# Patient Record
Sex: FEMALE | Race: WHITE
[De-identification: names, ages, dates, MRNs, and addresses within clinical notes are randomized per-mention and may not be internally consistent; named-entity substitution may affect disease eponyms.]

---

## 2017-12-10 ENCOUNTER — HOSPITAL ENCOUNTER (EMERGENCY)
Dept: HOSPITAL 5 - ED | Age: 37
LOS: 1 days | Discharge: HOME | End: 2017-12-11
Payer: COMMERCIAL

## 2017-12-10 DIAGNOSIS — I10: ICD-10-CM

## 2017-12-10 DIAGNOSIS — Z88.0: ICD-10-CM

## 2017-12-10 DIAGNOSIS — R51: Primary | ICD-10-CM

## 2017-12-10 DIAGNOSIS — J45.909: ICD-10-CM

## 2017-12-10 LAB
ANION GAP SERPL CALC-SCNC: 21 MMOL/L
APTT BLD: 31.8 SEC. (ref 24.2–36.6)
BASOPHILS NFR BLD AUTO: 0.6 % (ref 0–1.8)
BILIRUB UR QL STRIP: (no result)
BLOOD UR QL VISUAL: (no result)
BUN SERPL-MCNC: 9 MG/DL (ref 7–17)
BUN/CREAT SERPL: 18 %
CALCIUM SERPL-MCNC: 9.4 MG/DL (ref 8.4–10.2)
CHLORIDE SERPL-SCNC: 98.4 MMOL/L (ref 98–107)
CO2 SERPL-SCNC: 24 MMOL/L (ref 22–30)
EOSINOPHIL NFR BLD AUTO: 6.1 % (ref 0–4.3)
GLUCOSE SERPL-MCNC: 103 MG/DL (ref 65–100)
HCT VFR BLD CALC: 39 % (ref 30.3–42.9)
HGB BLD-MCNC: 12.1 GM/DL (ref 10.1–14.3)
INR PPP: 0.94 (ref 0.87–1.13)
KETONES UR STRIP-MCNC: (no result) MG/DL
LEUKOCYTE ESTERASE UR QL STRIP: (no result)
MCH RBC QN AUTO: 24 PG (ref 28–32)
MCHC RBC AUTO-ENTMCNC: 31 % (ref 30–34)
MCV RBC AUTO: 77 FL (ref 79–97)
NITRITE UR QL STRIP: (no result)
PH UR STRIP: 5 [PH] (ref 5–7)
PLATELET # BLD: 270 K/MM3 (ref 140–440)
POTASSIUM SERPL-SCNC: 3.9 MMOL/L (ref 3.6–5)
PROT UR STRIP-MCNC: (no result) MG/DL
RBC # BLD AUTO: 5.08 M/MM3 (ref 3.65–5.03)
RBC #/AREA URNS HPF: 1 /HPF (ref 0–6)
SODIUM SERPL-SCNC: 139 MMOL/L (ref 137–145)
UROBILINOGEN UR-MCNC: < 2 MG/DL (ref ?–2)
WBC # BLD AUTO: 16.7 K/MM3 (ref 4.5–11)
WBC #/AREA URNS HPF: 1 /HPF (ref 0–6)

## 2017-12-10 PROCEDURE — 85610 PROTHROMBIN TIME: CPT

## 2017-12-10 PROCEDURE — 93010 ELECTROCARDIOGRAM REPORT: CPT

## 2017-12-10 PROCEDURE — 85670 THROMBIN TIME PLASMA: CPT

## 2017-12-10 PROCEDURE — 81001 URINALYSIS AUTO W/SCOPE: CPT

## 2017-12-10 PROCEDURE — 36415 COLL VENOUS BLD VENIPUNCTURE: CPT

## 2017-12-10 PROCEDURE — 70450 CT HEAD/BRAIN W/O DYE: CPT

## 2017-12-10 PROCEDURE — 85025 COMPLETE CBC W/AUTO DIFF WBC: CPT

## 2017-12-10 PROCEDURE — 84484 ASSAY OF TROPONIN QUANT: CPT

## 2017-12-10 PROCEDURE — 85730 THROMBOPLASTIN TIME PARTIAL: CPT

## 2017-12-10 PROCEDURE — 93005 ELECTROCARDIOGRAM TRACING: CPT

## 2017-12-10 PROCEDURE — 80048 BASIC METABOLIC PNL TOTAL CA: CPT

## 2017-12-10 PROCEDURE — 81025 URINE PREGNANCY TEST: CPT

## 2017-12-10 NOTE — CAT SCAN REPORT
FINAL REPORT



PROCEDURE:  CT HEAD/BRAIN WO CON



TECHNIQUE:  Computerized tomography of the head was performed

without contrast material. 



HISTORY:  severe headache 



COMPARISON:  Head CT dated June 2, 2017



FINDINGS:  

Minimal mucosal thickening is seen in the ethmoid sinuses.

Mastoid air cells are clear. No calvarial fracture is seen.

Cerebral ventricles are normal in size. No CVA is seen. No acute

intracranial hemorrhage or mass effect is seen. 



IMPRESSION:  

There may be minimal changes of chronic sinusitis but no

intracranial abnormality is seen.

## 2017-12-11 VITALS — SYSTOLIC BLOOD PRESSURE: 114 MMHG | DIASTOLIC BLOOD PRESSURE: 38 MMHG

## 2017-12-11 NOTE — EMERGENCY DEPARTMENT REPORT
HPI





- General


Chief Complaint: Headache


Time Seen by Provider: 12/11/17 09:21





- HPI


HPI: 


This is a 37-year-old  female presents to the emergency department from 

home with a complaint of a posterior headache that started yesterday as well as 

some paresthesias of the right fingers.  She did not take anything at home for 

her symptoms prior to presentation but was given some Tylenol here.  The 

patient has been in the emergency department for almost 12 hours prior to 

getting back to the main portion of emergency department and she now says that 

her pain has completely resolved.  She has a past medical history of 

hypertension and possibly one previous TIA.  She denies any vision change, 

chest pain, fever, nausea, vomiting, slurred speech.  No recent travel or sick 

contacts at home.  She does not have a primary care physician.








ED Past Medical Hx





- Past Medical History


Hx Hypertension: Yes (with pregnancy)


Hx Heart Attack/AMI: No


Hx Congestive Heart Failure: No


Hx Diabetes: No


Hx Pulmonary Embolism: No


Hx Liver Disease: No


Hx Renal Disease: No


Hx Headaches / Migraines: No


Hx Seizures: No


Hx Asthma: Yes


Hx COPD: No


Hx Tuberculosis: No


Hx HIV: No


Additional medical history: TIA.  OBESITY





- Surgical History


Hx Appendectomy: Yes


Additional Surgical History: LEFT FOOT SURGERY/ CS X 3





- Social History


Smoking Status: Never Smoker


Substance Use Type: None





- Medications


Home Medications: 


 Home Medications











 Medication  Instructions  Recorded  Confirmed  Last Taken  Type


 


Albuterol Sulfate [Proair 2 puff IH Q4HR PRN #1 aer.pow.ba 10/19/16  Unknown Rx





Respiclick]     


 


Montelukast [Singulair] 10 mg PO QPM #30 tablet 10/19/16  Unknown Rx


 


Butalb/Acetamin/Caff -40 1 each PO Q4H PRN #20 tablet 06/02/17  Unknown Rx





[Fioricet]     


 


ALBUTEROL Inhaler [ProAir HFA 2 puff IH QID PRN #2 inhalation 09/10/17  Unknown 

Rx





Inhaler]     


 


predniSONE [Deltasone] 20 mg PO QDAY #5 tab 09/10/17  Unknown Rx














ED Review of Systems


ROS: 


Stated complaint: HEADACHE


Other details as noted in HPI





Comment: All other systems reviewed and negative


Constitutional: denies: chills, fever


Eyes: denies: eye pain, eye discharge, vision change


ENT: denies: ear pain, throat pain


Respiratory: denies: cough, shortness of breath, wheezing


Cardiovascular: denies: chest pain, palpitations


Gastrointestinal: denies: abdominal pain, nausea, diarrhea


Genitourinary: denies: urgency, dysuria, discharge


Musculoskeletal: denies: back pain, joint swelling, arthralgia


Skin: denies: rash, lesions


Neurological: headache, paresthesias.  denies: weakness, numbness





Physical Exam





- Physical Exam


Vital Signs: 


 Vital Signs











  12/10/17 12/11/17





  21:54 02:36


 


Temperature 98.3 F 97.8 F


 


Pulse Rate 86 80


 


Respiratory 20 18





Rate  


 


Blood Pressure 169/99 147/70


 


O2 Sat by Pulse 95 99





Oximetry  











Physical Exam: 


GENERAL: The patient is well-developed well-nourished.


HENT: Normocephalic.  Atraumatic.    Patient has moist mucous membranes.


EYES: Extraocular motions are intact.  Pupils equal reactive to light 

bilaterally.  No nystagmus.


NECK: Supple.  Trachea is midline.  No midline tenderness to palpation, step-

off or deformity.


CHEST/LUNGS: Clear to auscultation.  There is no respiratory distress noted.


HEART/CARDIOVASCULAR: Regular.  There is no tachycardia.  There is no murmur.


ABDOMEN: Abdomen is soft, nontender.  Patient has normal bowel sounds.  There 

is no abdominal distention.  Morbidly obese habitus.


SKIN: There is no rash.  There is no edema.  There is no diaphoresis.


NEURO: The patient is awake, alert, and oriented.  The patient is cooperative.  

The patient has no focal neurologic deficits.  The patient has normal speech.  

Cranial nerves II through XII grossly intact.  No dysmetria.  No pronator drift.


MUSCULOSKELETAL: There is no tenderness or deformity.  There is no limitation 

range of motion.  There is no evidence of acute injury.








ED Course


 Vital Signs











  12/10/17 12/11/17





  21:54 02:36


 


Temperature 98.3 F 97.8 F


 


Pulse Rate 86 80


 


Respiratory 20 18





Rate  


 


Blood Pressure 169/99 147/70


 


O2 Sat by Pulse 95 99





Oximetry  














ED Medical Decision Making





- Lab Data


Result diagrams: 


 12/10/17 22:08





 12/10/17 22:08





- EKG Data


-: EKG Interpreted by Me


EKG shows normal: sinus rhythm, axis, intervals, QRS complexes, ST-T waves


Rate: normal





- EKG Data


When compared to previous EKG there are: previous EKG unavailable


Interpretation: normal EKG





- Radiology Data


Radiology results: report reviewed





PROCEDURE: CT HEAD/BRAIN WO CON 





TECHNIQUE: Computerized tomography of the head was performed 


without contrast material. 





HISTORY: severe headache 





COMPARISON: Head CT dated June 2, 2017 





FINDINGS: 


Minimal mucosal thickening is seen in the ethmoid sinuses. 


Mastoid air cells are clear. No calvarial fracture is seen. 


Cerebral ventricles are normal in size. No CVA is seen. No acute 


intracranial hemorrhage or mass effect is seen. 





IMPRESSION: 


There may be minimal changes of chronic sinusitis but no 


intracranial abnormality is seen. 











Transcribed By: WW 


Dictated By: JOSE ONEIL JR, MD 


Electronically Authenticated By: JOSE ONEIL JR, MD 


Signed Date/Time: 12/10/17 1910 





- Medical Decision Making





This patient had been in the emergency department for 12 hours prior to the 

chart being available to me to see the patient.  Upon doing the history and 

physical, the patient says that her symptoms have resolved.  She does not have 

any focal, motor or sensory deficits in her cranial nerves are intact.  CT of 

the head does not show any bleed, shift, mass or any acute process.  She does 

have some hypertension but it is not an unreasonable level and otherwise the 

vitals are stable and has been throughout ED course included being afebrile.  

She does have a leukocytosis but there is no left shift and otherwise the rest 

of the labs are unremarkable.  Her symptoms resolved with only a dose of 

Tylenol.  For all these reasons the patient appears safe for discharge home.  

She was given referrals for primary care and encouraged to return to the ER 

with any worsening of her symptoms or any acute distress.





- Differential Diagnosis


tension headache, migraine, cluster headache, brain bleed


Critical Care Time: No


Critical care attestation.: 


If time is entered above; I have spent that time in minutes in the direct care 

of this critically ill patient, excluding procedure time.








ED Disposition


Clinical Impression: 


Headache


Qualifiers:


 Headache type: unspecified Headache chronicity pattern: unspecified pattern 

Intractability: not intractable Qualified Code(s): R51 - Headache





Hypertension


Qualifiers:


 Hypertension type: essential hypertension Qualified Code(s): I10 - Essential (

primary) hypertension





Disposition: DC-01 TO HOME OR SELFCARE


Is pt being admited?: No


Condition: Stable


Instructions:  Acute Headache (ED), Hypertension (ED)


Additional Instructions: 


Please follow up with a primary care physician in the next few days.  Return to 

the emergency Department with any worsening of your symptoms or any acute 

distress.


Referrals: 


[facility] [Outside] - 3-5 Days


PRIMARY CARE,MD [Primary Care Provider] - 3-5 Days


MALISSA LEY MD [Staff Physician] - 3-5 Days


Forms:  Work/School Release Form(ED)


Time of Disposition: 09:46


Print Language: Chilean

## 2019-09-29 ENCOUNTER — HOSPITAL ENCOUNTER (EMERGENCY)
Dept: HOSPITAL 5 - ED | Age: 39
LOS: 1 days | Discharge: LEFT BEFORE DISCHARGE | End: 2019-09-30
Payer: SELF-PAY

## 2019-09-29 VITALS — SYSTOLIC BLOOD PRESSURE: 171 MMHG | DIASTOLIC BLOOD PRESSURE: 83 MMHG

## 2019-09-29 DIAGNOSIS — Z88.0: ICD-10-CM

## 2019-09-29 DIAGNOSIS — I10: ICD-10-CM

## 2019-09-29 DIAGNOSIS — Z98.890: ICD-10-CM

## 2019-09-29 DIAGNOSIS — J45.909: Primary | ICD-10-CM

## 2019-09-29 DIAGNOSIS — Z90.49: ICD-10-CM

## 2019-09-29 DIAGNOSIS — Z79.899: ICD-10-CM

## 2019-09-29 PROCEDURE — 94644 CONT INHLJ TX 1ST HOUR: CPT

## 2019-09-29 PROCEDURE — 71046 X-RAY EXAM CHEST 2 VIEWS: CPT

## 2019-09-29 PROCEDURE — 99283 EMERGENCY DEPT VISIT LOW MDM: CPT

## 2019-09-29 NOTE — EMERGENCY DEPARTMENT REPORT
ED Asthma HPI





- General


Chief Complaint: Adult Asthma


Stated Complaint: ASTHMA


Time Seen by Provider: 19 21:44


Source: patient


Mode of arrival: Ambulatory


Limitations: No Limitations





- History of Present Illness


Initial Comments: 





39-year-old  female presents to the emergency room for shortness of 

breath and coughing for 3 days.  Patient reports that the coughing has gotten 

worse in the last 3 hours.  Patient reports she ran out of her inhaler 1 week 

ago.  Patient denies any fever denies any nausea admits to posttussis emesis.  

Patient currently has no medications has an allergy to penicillin.  Patient does

not have a primary care provider.


MD Complaint: shortness of breath, wheezing


Onset/Timing: 3


Asthma History: history of frequent attac


Severity: moderate


Context: ran out of meds


Associated Symptoms: productive cough





- Related Data


Current Asthma Therapy: inhaled bronchodilator


                                  Previous Rx's











 Medication  Instructions  Recorded  Last Taken  Type


 


Montelukast [Singulair] 10 mg PO QPM #30 tablet 10/19/16 Unknown Rx


 


Butalb/Acetamin/Caff -40 1 each PO Q4H PRN #20 tablet 17 Unknown Rx





[Fioricet]    


 


ALBUTEROL Inhaler (OR & NICU) 2 puff IH QID PRN #2 inhalation 09/10/17 Unknown 

Rx





[ProAir HFA Inhaler]    


 


Acetaminophen [Tylenol Arthritis] 650 mg PO Q6HR PRN #30 tablet.er 18 

Unknown Rx


 


Ibuprofen [Motrin] 600 mg PO Q8H PRN #30 tablet 18 Unknown Rx


 


Albuterol Sulfate [Proair 2 puff IH Q4HR PRN #1 aer.pow.ba 19 Unknown Rx





Respiclick]    


 


predniSONE [Deltasone] 20 mg PO QDAY #5 tab 19 Unknown Rx











                                    Allergies











Allergy/AdvReac Type Severity Reaction Status Date / Time


 


Penicillins Allergy  Rash Verified 10/19/16 09:15














ED Review of Systems


ROS: 


Stated complaint: ASTHMA


Other details as noted in HPI





Comment: All other systems reviewed and negative


Respiratory: cough, shortness of breath





ED Past Medical Hx





- Past Medical History


Previous Medical History?: Yes


Hx Hypertension: Yes (with pregnancy)


Hx Heart Attack/AMI: No


Hx Congestive Heart Failure: No


Hx Diabetes: No


Hx Pulmonary Embolism: No


Hx Liver Disease: No


Hx Renal Disease: No


Hx Headaches / Migraines: No


Hx Seizures: No


Hx Asthma: Yes


Hx COPD: No


Hx Tuberculosis: No


Hx HIV: No


Additional medical history: TIA.  OBESITY





- Surgical History


Past Surgical History?: Yes


Hx Appendectomy: Yes


Additional Surgical History: LEFT FOOT SURGERY/ CS X 3





- Social History


Smoking Status: Never Smoker


Substance Use Type: None





- Medications


Home Medications: 


                                Home Medications











 Medication  Instructions  Recorded  Confirmed  Last Taken  Type


 


Montelukast [Singulair] 10 mg PO QPM #30 tablet 10/19/16  Unknown Rx


 


Butalb/Acetamin/Caff -40 1 each PO Q4H PRN #20 tablet 17  Unknown Rx





[Fioricet]     


 


ALBUTEROL Inhaler (OR & NICU) 2 puff IH QID PRN #2 inhalation 09/10/17  Unknown 

Rx





[ProAir HFA Inhaler]     


 


Acetaminophen [Tylenol Arthritis] 650 mg PO Q6HR PRN #30 tablet.er 18  

Unknown Rx


 


Ibuprofen [Motrin] 600 mg PO Q8H PRN #30 tablet 18  Unknown Rx


 


Albuterol Sulfate [Proair 2 puff IH Q4HR PRN #1 aer.pow.ba 19  Unknown Rx





Respiclick]     


 


predniSONE [Deltasone] 20 mg PO QDAY #5 tab 19  Unknown Rx














ED Physical Exam





- General


Limitations: No Limitations


General appearance: alert, in no apparent distress





- Head


Head exam: Present: atraumatic, normocephalic





- Eye


Eye exam: Present: normal appearance





- ENT


ENT exam: Present: mucous membranes moist





- Neck


Neck exam: Present: normal inspection





- Respiratory


Respiratory exam: Present: rhonchi





- Cardiovascular


Cardiovascular Exam: Present: regular rate, normal rhythm.  Absent: systolic 

murmur, diastolic murmur, rubs, gallop





- GI/Abdominal


GI/Abdominal exam: Present: soft, normal bowel sounds





- Neurological Exam


Neurological exam: Present: alert, oriented X3, normal gait





- Psychiatric


Psychiatric exam: Present: normal affect, normal mood





- Skin


Skin exam: Present: warm, dry, intact, normal color.  Absent: rash





ED Course





                                   Vital Signs











  19





  21:46 22:45


 


Temperature 98.8 F 


 


Pulse Rate 90 


 


Pulse Rate [  84





Bilateral Bases  





]  


 


Respiratory 18 





Rate  


 


Respiratory  18





Rate [Bilateral  





Bases]  


 


Blood Pressure 171/83 


 


O2 Sat by Pulse 97 





Oximetry  














ED Medical Decision Making





- Radiology Data


Radiology results: report reviewed





Patient: GALEN FLORES MR


#: [id number]


: 1980 Acct:[id number]





Age/Sex: 39 / F ADM Date: 19





Loc: ED


Attending Dr:








Ordering Physician: BEA POZO


Date of Service: 19


Procedure(s): XR chest routine 2V


Accession Number(s): [id number]





cc: BEA POZO





Fluoro Time In Minutes:





CHEST 2 VIEWS





INDICATION / CLINICAL INFORMATION:


Cough and chest discomfort.





COMPARISON:


10/19/2015.





FINDINGS:





SUPPORT DEVICES: None.


HEART / MEDIASTINUM: There is mild cardiomegaly. Pulmonary vasculature is 

normal.


LUNGS / PLEURA: No significant pulmonary or pleural abnormality. No 

pneumothorax.


ADDITIONAL FINDINGS: No significant additional findings.





IMPRESSION: Mild cardiomegaly without acute pulmonary disease.





Signer Name: Kole Carney MD


Signed: 2019 10:24 PM


Workstation Name: [id number]








Transcribed By: RT


Dictated By: Kole Carney MD


Electronically Authenticated By: Kole Carney MD


Signed Date/Time: 19











DD/DT: 19


TD/TT:





- Medical Decision Making








39-year-old  female presents to the emergency room for shortness of 

breath and coughing for 3 days.  Patient reports that the coughing has gotten 

worse in the last 3 hours.  Patient reports she ran out of her inhaler 1 week 

ago.  Patient denies any fever denies any nausea admits to posttussis emesis.  

Patient currently has no medications has an allergy to penicillin.  Patient does

not have a primary care provider.


Critical care attestation.: 


If time is entered above; I have spent that time in minutes in the direct care 

of this critically ill patient, excluding procedure time.








ED Disposition


Clinical Impression: 


 Asthma





Disposition:  PAT REG,NO TRIAGE


Is pt being admited?: No


Condition: Stable


Instructions:  Asthma (ED)


Prescriptions: 


predniSONE [Deltasone] 20 mg PO QDAY #5 tab


Albuterol Sulfate [Proair Respiclick] 2 puff IH Q4HR PRN #1 aer.pow.ba


 PRN Reason: Shortness Of Breath


Referrals: 


PRIMARY CARE,MD [Primary Care Provider] - 3-5 Days

## 2019-09-29 NOTE — EVENT NOTE
ED Screening Note


Date of service: 09/29/19


Time: 21:45


ED Screening Note: 





This is a 39 y.o. F. that presents to the ER with dyspnea and chest discomfort 

with cough today.





PMH of asthma and HTN





Reports running out of inhaler 1 week ago.





This initial assessment/diagnostic orders/clinical plan/treatment(s) is/are 

subject to change based on patients health status, clinical progression and re-

assessment by fellow clinical providers in the ED. Further treatment and workup 

at subsequent clinical providers discretion. Patient/guardian urged not to elope

from the ED as their condition may be serious if not clinically assessed and 

managed. 





Initial orders include: 





CXR





Duoneb

## 2019-09-29 NOTE — XRAY REPORT
CHEST 2 VIEWS



INDICATION / CLINICAL INFORMATION:

Cough and chest discomfort.



COMPARISON: 

10/19/2015.



FINDINGS:



SUPPORT DEVICES: None.

HEART / MEDIASTINUM: There is mild cardiomegaly. Pulmonary vasculature is normal. 

LUNGS / PLEURA: No significant pulmonary or pleural abnormality. No pneumothorax. 

ADDITIONAL FINDINGS: No significant additional findings.



IMPRESSION: Mild cardiomegaly without acute pulmonary disease.



Signer Name: Kole Carney MD 

Signed: 9/29/2019 10:24 PM

 Workstation Name: [id number]

## 2019-11-17 ENCOUNTER — HOSPITAL ENCOUNTER (EMERGENCY)
Dept: HOSPITAL 5 - ED | Age: 39
LOS: 1 days | Discharge: HOME | End: 2019-11-18
Payer: SELF-PAY

## 2019-11-17 DIAGNOSIS — Z88.0: ICD-10-CM

## 2019-11-17 DIAGNOSIS — Z79.899: ICD-10-CM

## 2019-11-17 DIAGNOSIS — J45.909: Primary | ICD-10-CM

## 2019-11-17 DIAGNOSIS — Z98.890: ICD-10-CM

## 2019-11-17 DIAGNOSIS — I10: ICD-10-CM

## 2019-11-17 DIAGNOSIS — Z90.89: ICD-10-CM

## 2019-11-17 PROCEDURE — 99282 EMERGENCY DEPT VISIT SF MDM: CPT

## 2019-11-17 PROCEDURE — 94640 AIRWAY INHALATION TREATMENT: CPT

## 2019-11-17 PROCEDURE — 94644 CONT INHLJ TX 1ST HOUR: CPT

## 2019-11-17 PROCEDURE — 96372 THER/PROPH/DIAG INJ SC/IM: CPT

## 2019-11-17 NOTE — EMERGENCY DEPARTMENT REPORT
ED Asthma HPI





- General


Chief Complaint: Adult Asthma


Stated Complaint: ASTHMA


Time Seen by Provider: 19 21:55


Source: patient


Mode of arrival: Ambulatory


Limitations: No Limitations





- History of Present Illness


Initial Comments: 





39-year-old  female presents to the emergency room for asthma 

exacerbation 2 days.  Patient denies any fever.  Patient reports she ran out of

her inhaler.  Patient states that she was seen here last month for the same.


MD Complaint: "asthma attack"


Onset/Timin


-: days(s)


Asthma History: history of frequent attac


Severity: moderate


Context: ran out of meds


Associated Symptoms: dry cough





- Related Data


Current Asthma Therapy: inhaled bronchodilator


                                  Previous Rx's











 Medication  Instructions  Recorded  Last Taken  Type


 


Montelukast [Singulair] 10 mg PO QPM #30 tablet 10/19/16 Unknown Rx


 


Butalb/Acetamin/Caff -40 1 each PO Q4H PRN #20 tablet 17 Unknown Rx





[Fioricet]    


 


Acetaminophen [Tylenol Arthritis] 650 mg PO Q6HR PRN #30 tablet.er 18 

Unknown Rx


 


Ibuprofen [Motrin] 600 mg PO Q8H PRN #30 tablet 18 Unknown Rx


 


Albuterol Sulfate [Proair 2 puff IH Q4HR PRN #1 aer.pow.ba 19 Unknown Rx





Respiclick]    


 


ALBUTEROL Inhaler (OR & NICU) 2 puff IH QID PRN #2 inhalation 19 Unknown 

Rx





[ProAir HFA Inhaler]    


 


ALBUTEROL NEB's [Proventil 0.083% 2.5 mg IH TID PRN #270 neb 19 Unknown Rx





NEBS]    


 


Nebulizer and Compressor [Portable 1 each MC TID #1 each 19 Unknown Rx





Nebulizer System]    


 


predniSONE [Deltasone] 20 mg PO QDAY #5 tab 19 Unknown Rx











                                    Allergies











Allergy/AdvReac Type Severity Reaction Status Date / Time


 


Penicillins Allergy  Rash Verified 10/19/16 09:15














ED Review of Systems


ROS: 


Stated complaint: ASTHMA


Other details as noted in HPI





Comment: All other systems reviewed and negative





ED Past Medical Hx





- Past Medical History


Previous Medical History?: Yes


Hx Hypertension: Yes (with pregnancy)


Hx Heart Attack/AMI: No


Hx Congestive Heart Failure: No


Hx Diabetes: No


Hx Pulmonary Embolism: No


Hx Liver Disease: No


Hx Renal Disease: No


Hx Headaches / Migraines: No


Hx Seizures: No


Hx Asthma: Yes


Hx COPD: No


Hx Tuberculosis: No


Hx HIV: No


Additional medical history: TIA.  OBESITY





- Surgical History


Past Surgical History?: Yes


Hx Appendectomy: Yes


Additional Surgical History: LEFT FOOT SURGERY/ CS X 3





- Social History


Smoking Status: Never Smoker


Substance Use Type: None





- Medications


Home Medications: 


                                Home Medications











 Medication  Instructions  Recorded  Confirmed  Last Taken  Type


 


Montelukast [Singulair] 10 mg PO QPM #30 tablet 10/19/16  Unknown Rx


 


Butalb/Acetamin/Caff -40 1 each PO Q4H PRN #20 tablet 17  Unknown Rx





[Fioricet]     


 


Acetaminophen [Tylenol Arthritis] 650 mg PO Q6HR PRN #30 tablet.er 18  

Unknown Rx


 


Ibuprofen [Motrin] 600 mg PO Q8H PRN #30 tablet 18  Unknown Rx


 


Albuterol Sulfate [Proair 2 puff IH Q4HR PRN #1 aer.pow.ba 19  Unknown Rx





Respiclick]     


 


ALBUTEROL Inhaler (OR & NICU) 2 puff IH QID PRN #2 inhalation 19  Unknown 

Rx





[ProAir HFA Inhaler]     


 


ALBUTEROL NEB's [Proventil 0.083% 2.5 mg IH TID PRN #270 neb 19  Unknown 

Rx





NEBS]     


 


Nebulizer and Compressor [Portable 1 each MC TID #1 each 19  Unknown Rx





Nebulizer System]     


 


predniSONE [Deltasone] 20 mg PO QDAY #5 tab 19  Unknown Rx














ED Physical Exam





- General


Limitations: No Limitations


General appearance: alert, in no apparent distress





- Head


Head exam: Present: atraumatic, normocephalic





- Eye


Eye exam: Present: normal appearance





- ENT


ENT exam: Present: mucous membranes moist





- Neck


Neck exam: Present: normal inspection





- Respiratory


Respiratory exam: Present: wheezes, rhonchi





- Cardiovascular


Cardiovascular Exam: Present: regular rate, normal rhythm.  Absent: systolic 

murmur, diastolic murmur, rubs, gallop





- Neurological Exam


Neurological exam: Present: alert, oriented X3





- Psychiatric


Psychiatric exam: Present: normal affect, normal mood





- Skin


Skin exam: Present: warm, dry, intact, normal color.  Absent: rash





ED Course


                                   Vital Signs











  19





  21:45 22:15


 


Temperature 98.8 F 


 


Pulse Rate 93 H 


 


Pulse Rate [  96 H





Anterior  





Bilateral  





Throughout]  


 


Respiratory 18 





Rate  


 


Respiratory  21





Rate [Anterior  





Bilateral  





Throughout]  


 


Blood Pressure 172/97 


 


O2 Sat by Pulse 95 





Oximetry  














ED Medical Decision Making





- Medical Decision Making








39-year-old  female presents to the emergency room for asthma 

exacerbation 2 days.  Patient denies any fever.  Patient reports she ran out of

her inhaler.  Patient states that she was seen here last month for the same.











Respiratory therapy has been called for respiratory medications.


Critical care attestation.: 


If time is entered above; I have spent that time in minutes in the direct care 

of this critically ill patient, excluding procedure time.








ED Disposition


Clinical Impression: 


 Asthma





Disposition: DC-01 TO HOME OR SELFCARE


Is pt being admited?: No


Does the pt Need Aspirin: No


Condition: Stable


Instructions:  Asthma (ED), Reactive Airways Disease (ED)


Prescriptions: 


predniSONE [Deltasone] 20 mg PO QDAY #5 tab


Nebulizer and Compressor [Portable Nebulizer System] 1 each MC TID #1 each


ALBUTEROL Inhaler (OR & NICU) [ProAir HFA Inhaler] 2 puff IH QID PRN #2 

inhalation


 PRN Reason: Shortness Of Breath


ALBUTEROL NEB's [Proventil 0.083% NEBS] 2.5 mg IH TID PRN #270 neb


 PRN Reason: Wheezing


Referrals: 


PRIMARY CARE,MD [Primary Care Provider] - 3-5 Days

## 2019-11-17 NOTE — EMERGENCY DEPARTMENT REPORT
Blank Doc





- Documentation


Documentation: 





39-year-old female that presents with asthma exacerbation.





This initial assessment/diagnostic orders/clinical plan/treatment(s) is/are 

subject to change based on patient's health status, clinical progression and re-

assessment by fellow clinical providers in the ED.  Further treatment and workup

at subsequent clinical providers discretion.  Patient/guardians urged not to 

elope from the ED as their condition may be serious if not clinically assessed 

and managed.  Initial orders include:


1- Patient sent to [facility] for further evaluation and treatment


2- breathing treatment/steroids

## 2019-11-18 VITALS — DIASTOLIC BLOOD PRESSURE: 76 MMHG | SYSTOLIC BLOOD PRESSURE: 127 MMHG

## 2020-01-12 ENCOUNTER — HOSPITAL ENCOUNTER (EMERGENCY)
Dept: HOSPITAL 5 - ED | Age: 40
Discharge: LEFT BEFORE DISCHARGE | End: 2020-01-12
Payer: COMMERCIAL

## 2020-01-12 VITALS — DIASTOLIC BLOOD PRESSURE: 105 MMHG | SYSTOLIC BLOOD PRESSURE: 174 MMHG

## 2020-01-12 DIAGNOSIS — Z53.21: ICD-10-CM

## 2020-01-12 DIAGNOSIS — R20.0: Primary | ICD-10-CM

## 2020-01-12 PROCEDURE — 93005 ELECTROCARDIOGRAM TRACING: CPT

## 2020-01-12 PROCEDURE — 93010 ELECTROCARDIOGRAM REPORT: CPT

## 2020-03-24 ENCOUNTER — HOSPITAL ENCOUNTER (EMERGENCY)
Dept: HOSPITAL 5 - ED | Age: 40
Discharge: HOME | End: 2020-03-24
Payer: COMMERCIAL

## 2020-03-24 VITALS — SYSTOLIC BLOOD PRESSURE: 141 MMHG | DIASTOLIC BLOOD PRESSURE: 99 MMHG

## 2020-03-24 DIAGNOSIS — J45.909: Primary | ICD-10-CM

## 2020-03-24 PROCEDURE — 99282 EMERGENCY DEPT VISIT SF MDM: CPT

## 2020-03-24 NOTE — EMERGENCY DEPARTMENT REPORT
ED General Adult HPI





- General


Chief complaint: Upper Respiratory Infection


Stated complaint: ASTHMA


Time Seen by Provider: 03/24/20 13:38


Source: patient


Mode of arrival: Ambulatory


Limitations: No Limitations





- History of Present Illness


Initial comments: 


40yo  female states that she has asthma symptoms along with SOB x 3 days

and is currently out of her HARRIS. She further states that her SOB are mild.


-: days(s)


Location: chest


Severity scale (0 -10): 4


Quality: aching


Consistency: intermittent


Improves with: none


Worsens with: none


Associated Symptoms: denies other symptoms


Treatments Prior to Arrival: none





- Related Data


                                  Previous Rx's











 Medication  Instructions  Recorded  Last Taken  Type


 


Montelukast [Singulair] 10 mg PO QPM #30 tablet 10/19/16 Unknown Rx


 


Butalb/Acetamin/Caff -40 1 each PO Q4H PRN #20 tablet 06/02/17 Unknown Rx





[Fioricet]    


 


Acetaminophen [Tylenol Arthritis] 650 mg PO Q6HR PRN #30 tablet.er 12/26/18 

Unknown Rx


 


Ibuprofen [Motrin] 600 mg PO Q8H PRN #30 tablet 12/26/18 Unknown Rx


 


Albuterol Sulfate [Proair 2 puff IH Q4HR PRN #1 aer.pow.ba 09/29/19 Unknown Rx





Respiclick]    


 


ALBUTEROL NEB's [Proventil 0.083% 2.5 mg IH TID PRN #270 neb 11/17/19 Unknown Rx





NEBS]    


 


Albuterol INH(or & Nicu Only) 2 puff IH QID PRN #2 inhalation 11/17/19 Unknown 

Rx





[ProAir HFA Inhaler]    


 


Nebulizer and Compressor [Portable 1 each MC TID #1 each 11/17/19 Unknown Rx





Nebulizer System]    


 


predniSONE [Deltasone] 20 mg PO QDAY #5 tab 11/17/19 Unknown Rx


 


Albuterol INH(or & Nicu Only) 2 puff IH QID PRN #8.5 gram 03/24/20 Unknown Rx





[ProAir HFA Inhaler]    











                                    Allergies











Allergy/AdvReac Type Severity Reaction Status Date / Time


 


Penicillins Allergy  Rash Verified 10/19/16 09:15














ED Review of Systems


ROS: 


Stated complaint: ASTHMA


Other details as noted in HPI





Comment: All other systems reviewed and negative


Constitutional: no symptoms reported


Respiratory: see HPI


Cardiovascular: as per HPI





ED Past Medical Hx





- Past Medical History


Hx Hypertension: Yes (with pregnancy)


Hx Heart Attack/AMI: No


Hx Congestive Heart Failure: No


Hx Diabetes: No


Hx Pulmonary Embolism: No


Hx Liver Disease: No


Hx Renal Disease: No


Hx Headaches / Migraines: No


Hx Seizures: No


Hx Asthma: Yes


Hx COPD: No


Hx Tuberculosis: No


Hx HIV: No


Additional medical history: TIA.  OBESITY





- Surgical History


Hx Appendectomy: Yes


Additional Surgical History: LEFT FOOT SURGERY/ CS X 3





- Social History


Smoking Status: Never Smoker


Substance Use Type: None





- Medications


Home Medications: 


                                Home Medications











 Medication  Instructions  Recorded  Confirmed  Last Taken  Type


 


Montelukast [Singulair] 10 mg PO QPM #30 tablet 10/19/16  Unknown Rx


 


Butalb/Acetamin/Caff -40 1 each PO Q4H PRN #20 tablet 06/02/17  Unknown Rx





[Fioricet]     


 


Acetaminophen [Tylenol Arthritis] 650 mg PO Q6HR PRN #30 tablet.er 12/26/18  

Unknown Rx


 


Ibuprofen [Motrin] 600 mg PO Q8H PRN #30 tablet 12/26/18  Unknown Rx


 


Albuterol Sulfate [Proair 2 puff IH Q4HR PRN #1 aer.pow.ba 09/29/19  Unknown Rx





Respiclick]     


 


ALBUTEROL NEB's [Proventil 0.083% 2.5 mg IH TID PRN #270 neb 11/17/19  Unknown 

Rx





NEBS]     


 


Albuterol INH(or & Nicu Only) 2 puff IH QID PRN #2 inhalation 11/17/19  Unknown 

Rx





[ProAir HFA Inhaler]     


 


Nebulizer and Compressor [Portable 1 each MC TID #1 each 11/17/19  Unknown Rx





Nebulizer System]     


 


predniSONE [Deltasone] 20 mg PO QDAY #5 tab 11/17/19  Unknown Rx


 


Albuterol INH(or & Nicu Only) 2 puff IH QID PRN #8.5 gram 03/24/20  Unknown Rx





[ProAir HFA Inhaler]     














ED Physical Exam





- General


Limitations: No Limitations


General appearance: alert





- Head


Head exam: Present: atraumatic, normocephalic, normal inspection





- Eye


Eye exam: Present: normal appearance, PERRL, EOMI.  Absent: scleral icterus





- ENT


ENT exam: Present: normal exam, normal orophraynx, normal external ear exam





- Neck


Neck exam: Present: normal inspection, full ROM.  Absent: tenderness





- Respiratory


Respiratory exam: Present: normal lung sounds bilaterally, respiratory distress 

(mild with inspiration), wheezes (CESAR mild wheeze)





- Cardiovascular


Cardiovascular Exam: Present: regular rate, normal rhythm, bradycardia





- GI/Abdominal


GI/Abdominal exam: Present: soft.  Absent: distended, tenderness





- Rectal


Rectal exam: Present: deferred





- Extremities Exam


Extremities exam: Present: normal inspection, full ROM.  Absent: tenderness





- Back Exam


Back exam: Present: normal inspection, full ROM.  Absent: tenderness





- Neurological Exam


Neurological exam: Present: alert, altered, oriented X3





- Psychiatric


Psychiatric exam: Present: normal affect, normal mood.  Absent: anxious





- Skin


Skin exam: Present: warm, dry, intact, normal color





ED Course





                                   Vital Signs











  03/24/20





  11:59


 


Temperature 97.8 F


 


Pulse Rate 84


 


Respiratory 16





Rate 


 


Blood Pressure 141/99





[Left] 


 


O2 Sat by Pulse 96





Oximetry 














ED Medical Decision Making





- Medical Decision Making


40yo  female states that she has asthma symptoms along with SOB x 3 days

 and is currently out of her HARRIS. She further states that her SOB are mild. Pt 

was assessed and her mild SOB was treated with a refill on her HARRIS inhaler. She

 was instructed to use it as prescribed, f/u with her PCP and see ER if symptoms

 worsen. 





Critical care attestation.: 


If time is entered above; I have spent that time in minutes in the direct care 

of this critically ill patient, excluding procedure time.








ED Disposition


Clinical Impression: 


 Asthma





Disposition: DC-01 TO HOME OR SELFCARE


Is pt being admited?: No


Does the pt Need Aspirin: No


Condition: Stable


Instructions:  Asthma (ED)


Additional Instructions: 


She was instructed to use it as prescribed, f/u with her PCP and see ER if 

symptoms worsen. 


Prescriptions: 


Albuterol INH(or & Nicu Only) [ProAir HFA Inhaler] 2 puff IH QID PRN #8.5 gram


 PRN Reason: Shortness Of Breath


Referrals: 


PRIMARY CARE,MD [Primary Care Provider] - 3-5 Days


[facility] [Outside] - 3-5 Days

## 2020-05-24 ENCOUNTER — HOSPITAL ENCOUNTER (EMERGENCY)
Dept: HOSPITAL 5 - ED | Age: 40
Discharge: HOME | End: 2020-05-24
Payer: COMMERCIAL

## 2020-05-24 VITALS — DIASTOLIC BLOOD PRESSURE: 87 MMHG | SYSTOLIC BLOOD PRESSURE: 148 MMHG

## 2020-05-24 DIAGNOSIS — Z98.890: ICD-10-CM

## 2020-05-24 DIAGNOSIS — Z88.0: ICD-10-CM

## 2020-05-24 DIAGNOSIS — J45.901: Primary | ICD-10-CM

## 2020-05-24 DIAGNOSIS — Z90.49: ICD-10-CM

## 2020-05-24 DIAGNOSIS — J18.9: ICD-10-CM

## 2020-05-24 DIAGNOSIS — I10: ICD-10-CM

## 2020-05-24 DIAGNOSIS — Z79.1: ICD-10-CM

## 2020-05-24 DIAGNOSIS — Z79.899: ICD-10-CM

## 2020-05-24 DIAGNOSIS — Z79.2: ICD-10-CM

## 2020-05-24 LAB
ANISOCYTOSIS BLD QL SMEAR: (no result)
BAND NEUTROPHILS # (MANUAL): 0.3 K/MM3
BUN SERPL-MCNC: 16 MG/DL (ref 7–17)
BUN/CREAT SERPL: 27 %
CALCIUM SERPL-MCNC: 9.6 MG/DL (ref 8.4–10.2)
HCT VFR BLD CALC: 35.8 % (ref 30.3–42.9)
HEMOLYSIS INDEX: 4
HGB BLD-MCNC: 11.2 GM/DL (ref 10.1–14.3)
MCHC RBC AUTO-ENTMCNC: 31 % (ref 30–34)
MCV RBC AUTO: 80 FL (ref 79–97)
MYELOCYTES # (MANUAL): 0.2 K/MM3
PLATELET # BLD: 320 K/MM3 (ref 140–440)
PROMYELOCYTES # (MANUAL): 0 K/MM3
RBC # BLD AUTO: 4.46 M/MM3 (ref 3.65–5.03)
TOTAL CELLS COUNTED BLD: 100

## 2020-05-24 PROCEDURE — 36415 COLL VENOUS BLD VENIPUNCTURE: CPT

## 2020-05-24 PROCEDURE — 85007 BL SMEAR W/DIFF WBC COUNT: CPT

## 2020-05-24 PROCEDURE — 94644 CONT INHLJ TX 1ST HOUR: CPT

## 2020-05-24 PROCEDURE — 80048 BASIC METABOLIC PNL TOTAL CA: CPT

## 2020-05-24 PROCEDURE — 99284 EMERGENCY DEPT VISIT MOD MDM: CPT

## 2020-05-24 PROCEDURE — 85025 COMPLETE CBC W/AUTO DIFF WBC: CPT

## 2020-05-24 PROCEDURE — 87040 BLOOD CULTURE FOR BACTERIA: CPT

## 2020-05-24 PROCEDURE — 71045 X-RAY EXAM CHEST 1 VIEW: CPT

## 2020-05-24 NOTE — EMERGENCY DEPARTMENT REPORT
HPI





- General


Chief Complaint: Adult Asthma


Time Seen by Provider: 20 02:46





- HPI


HPI: 





Room 26








The patient is a 40-year-old female present with a chief complaint of asthma 

exacerbation.  The patient states for the past 3 days she has had a cough p

roductive of clear sputum.  Patient states her asthma is exacerbated causing her

to wheeze.  Patient denies history of fever.  In the ED patient states she feels

slightly improved since receiving a nebulizer





ED Past Medical Hx





- Past Medical History


Previous Medical History?: Yes


Hx Hypertension: Yes (with pregnancy)


Hx Asthma: Yes


Additional medical history: TIA.  OBESITY





- Surgical History


Past Surgical History?: Yes


Hx Appendectomy: Yes


Additional Surgical History: LEFT FOOT SURGERY/ CS X 3





- Family History


Family history: no significant





- Social History


Smoking Status: Never Smoker


Substance Use Type: None





- Medications


Home Medications: 


                                Home Medications











 Medication  Instructions  Recorded  Confirmed  Last Taken  Type


 


Montelukast [Singulair] 10 mg PO QPM #30 tablet 10/19/16  Unknown Rx


 


Butalb/Acetamin/Caff -40 1 each PO Q4H PRN #20 tablet 17  Unknown Rx





[Fioricet]     


 


Acetaminophen [Tylenol Arthritis] 650 mg PO Q6HR PRN #30 tablet.er 18  

Unknown Rx


 


Ibuprofen [Motrin] 600 mg PO Q8H PRN #30 tablet 18  Unknown Rx


 


Albuterol Sulfate [Proair 2 puff IH Q4HR PRN #1 aer.pow.ba 19  Unknown Rx





Respiclick]     


 


ALBUTEROL NEB's [Proventil 0.083% 2.5 mg IH TID PRN #270 neb 19  Unknown 

Rx





NEBS]     


 


Albuterol INH(or & Nicu Only) 2 puff IH QID PRN #2 inhalation 19  Unknown 

Rx





[ProAir HFA Inhaler]     


 


Nebulizer and Compressor [Portable 1 each MC TID #1 each 19  Unknown Rx





Nebulizer System]     


 


predniSONE [Deltasone] 20 mg PO QDAY #5 tab 19  Unknown Rx


 


Albuterol INH(or & Nicu Only) 2 puff IH QID PRN #8.5 gram 20  Unknown Rx





[ProAir HFA Inhaler]     


 


Albuterol INH(or & Nicu Only) 2 puff IH QID PRN #8.5 gram 20  Unknown Rx





[ProAir HFA Inhaler]     


 


Prednisone [predniSONE 10 mg 10 mg PO .TAPER #1 tab.ds.pk 20  Unknown Rx





(6-Day Pack, 21 Tabs)]     


 


levoFLOXacin [Levaquin TAB] 500 mg PO QDAY #10 tablet 20  Unknown Rx














ED Review of Systems


ROS: 


Stated complaint: ASTHMA ATTACK


Other details as noted in HPI





Constitutional: denies: fever


Respiratory: cough, wheezing





Physical Exam





- Physical Exam


Vital Signs: 


                                   Vital Signs











  20





  02:07 02:35


 


Temperature 98.5 F 


 


Pulse Rate 105 H 


 


Pulse Rate [  103 H





Bilateral  





Throughout]  


 


Respiratory 18 





Rate  


 


Respiratory  20





Rate [Bilateral  





Throughout]  


 


Blood Pressure 148/98 


 


O2 Sat by Pulse 97 





Oximetry  











Physical Exam: 





GENERAL: The patient is well-developed well-nourished female lying on stretcher 

not appearing to be in acute distress. []


HEENT: Normocephalic.  Atraumatic.  Extraocular motions are intact.  Patient has

 moist mucous membranes.


NECK: Supple.  Trachea midline


CHEST/LUNGS: Faint occasional expiratory wheezing.  There is no respiratory 

distress noted.


HEART/CARDIOVASCULAR: Regular.  There is no tachycardia.  There is no gallop rub

 or murmur.


ABDOMEN: Abdomen is soft, nontender.  Patient has normal bowel sounds.  There is

 no abdominal distention.


SKIN: There is no rash.  There is no edema.  There is no diaphoresis.


NEURO: The patient is awake, alert, and oriented.  The patient is cooperative.  

 The patient has normal speech


MUSCULOSKELETAL: There is no evidence of acute injury.





ED Course


                                   Vital Signs











  20





  02:07 02:35


 


Temperature 98.5 F 


 


Pulse Rate 105 H 


 


Pulse Rate [  103 H





Bilateral  





Throughout]  


 


Respiratory 18 





Rate  


 


Respiratory  20





Rate [Bilateral  





Throughout]  


 


Blood Pressure 148/98 


 


O2 Sat by Pulse 97 





Oximetry  














ED Medical Decision Making





- Lab Data


Result diagrams: 


                                 20 03:31





                                 20 03:31





- Radiology Data


Radiology results: report reviewed (Chest x-ray), image reviewed (Chest x-ray)


interpreted by me: 





Chest x-ray-right lower lobe infiltrate





Findings


[facility] [address] [place] [address] 

XRay Report Signed Patient: GALEN FLORES MR #: [id number] : 

1980 Acct:[id number] Age/Sex: 40 / F ADM Date: 20 Loc: ED 

Attending Dr: Ordering Physician: DEVAN SANCHES MD Date of Service: 20 

Procedure(s): XR chest 1V ap Accession Number(s): [id number] cc: DEVAN SANCHES MD 

Fluoro Time In Minutes: CHEST 1 VIEW, 2020 2:12 AM CLINICAL INFORMATION/IN

DICATION: Cough. COMPARISON: Chest radiograph, 2019 FINDINGS: SUPPORT 

DEVICES: None. HEART: There is stable mild enlargement of the cardiac 

silhouette. LUNGS/PLEURA: There is focal parenchymal density within the right 

midlung. The left lung appears clear. Lung volumes are low bilaterally. ADDIT

IONAL FINDINGS: No additional acute findings. IMPRESSION: 1. Focal parenchymal 

density in the right midlung suspicious for developing airspace disease such as 

pneumonia. Signer Name: Rosibel Ibrahim MD Signed: 2020 3:21 AM Workstation 

Name: [id number] Transcribed By: EB Dictated By: Rosibel Ibrahim MD 

Electronically Authenticated By: Rosibel Ibrahim MD Signed Date/Time: 

20 DD/DT: 20 TD/TT: 











- Medical Decision Making





Patient's SPO2 97% on room air after ambulation for 2 minutes





- Differential Diagnosis


Acute asthma exacerbation, bronchitis


Critical care attestation.: 


If time is entered above; I have spent that time in minutes in the direct care 

of this critically ill patient, excluding procedure time.








ED Disposition


Clinical Impression: 


 Acute asthma exacerbation, Pneumonia





Disposition: DC-01 TO HOME OR SELFCARE


Is pt being admited?: No


Does the pt Need Aspirin: No


Condition: Stable


Instructions:  Bacterial Pneumonia (ED)


Additional Instructions: 


You are to self quarantine at home for 14 days.  Return to the emergency 

department should you develop worsening symptoms, inability to tolerate food or 

liquids, high fever or any other concerns


Prescriptions: 


levoFLOXacin [Levaquin TAB] 500 mg PO QDAY #10 tablet


Prednisone [predniSONE 10 mg (6-Day Pack, 21 Tabs)] 10 mg PO .TAPER #1 tab.ds.pk


Albuterol INH(or & Nicu Only) [ProAir HFA Inhaler] 2 puff IH QID PRN #8.5 gram


 PRN Reason: Shortness Of Breath


Referrals: 


PRIMARY CARE,MD [Primary Care Provider] - 3-5 Days


[facility] [Provider Group] - 3-5 Days


Time of Disposition: 05:07

## 2020-05-24 NOTE — XRAY REPORT
CHEST 1 VIEW, 5/24/2020 2:12 AM 



CLINICAL INFORMATION/INDICATION: Cough.



COMPARISON: Chest radiograph, 9/29/2019



FINDINGS:



SUPPORT DEVICES: None.

HEART: There is stable mild enlargement of the cardiac silhouette.

LUNGS/PLEURA: There is focal parenchymal density within the right midlung. The left lung appears lester
r. Lung volumes are low bilaterally.

ADDITIONAL FINDINGS: No additional acute findings.



IMPRESSION:

1. Focal parenchymal density in the right midlung suspicious for developing airspace disease such as 
pneumonia.



Signer Name: Rosibel Ibrahim MD 

Signed: 5/24/2020 3:21 AM

Workstation Name: [id number]

## 2020-06-11 ENCOUNTER — HOSPITAL ENCOUNTER (EMERGENCY)
Dept: HOSPITAL 5 - ED | Age: 40
LOS: 1 days | Discharge: HOME | End: 2020-06-12
Payer: COMMERCIAL

## 2020-06-11 DIAGNOSIS — Z88.0: ICD-10-CM

## 2020-06-11 DIAGNOSIS — Z98.890: ICD-10-CM

## 2020-06-11 DIAGNOSIS — Z79.899: ICD-10-CM

## 2020-06-11 DIAGNOSIS — I10: ICD-10-CM

## 2020-06-11 DIAGNOSIS — J16.8: Primary | ICD-10-CM

## 2020-06-11 DIAGNOSIS — Z20.828: ICD-10-CM

## 2020-06-11 DIAGNOSIS — Z90.49: ICD-10-CM

## 2020-06-11 DIAGNOSIS — J45.909: ICD-10-CM

## 2020-06-11 LAB
BASOPHILS # (AUTO): 0 K/MM3 (ref 0–0.1)
BASOPHILS NFR BLD AUTO: 0.4 % (ref 0–1.8)
BUN SERPL-MCNC: 10 MG/DL (ref 7–17)
BUN/CREAT SERPL: 20 %
CALCIUM SERPL-MCNC: 9 MG/DL (ref 8.4–10.2)
EOSINOPHIL # BLD AUTO: 0.3 K/MM3 (ref 0–0.4)
EOSINOPHIL NFR BLD AUTO: 3.9 % (ref 0–4.3)
HCT VFR BLD CALC: 40.8 % (ref 30.3–42.9)
HEMOLYSIS INDEX: 5
HGB BLD-MCNC: 12.9 GM/DL (ref 10.1–14.3)
LYMPHOCYTES # BLD AUTO: 3 K/MM3 (ref 1.2–5.4)
LYMPHOCYTES NFR BLD AUTO: 33.6 % (ref 13.4–35)
MCHC RBC AUTO-ENTMCNC: 32 % (ref 30–34)
MCV RBC AUTO: 80 FL (ref 79–97)
MONOCYTES # (AUTO): 0.4 K/MM3 (ref 0–0.8)
MONOCYTES % (AUTO): 4.9 % (ref 0–7.3)
PLATELET # BLD: 195 K/MM3 (ref 140–440)
RBC # BLD AUTO: 5.08 M/MM3 (ref 3.65–5.03)

## 2020-06-11 PROCEDURE — 84484 ASSAY OF TROPONIN QUANT: CPT

## 2020-06-11 PROCEDURE — 71045 X-RAY EXAM CHEST 1 VIEW: CPT

## 2020-06-11 PROCEDURE — 93005 ELECTROCARDIOGRAM TRACING: CPT

## 2020-06-11 PROCEDURE — 82140 ASSAY OF AMMONIA: CPT

## 2020-06-11 PROCEDURE — 85025 COMPLETE CBC W/AUTO DIFF WBC: CPT

## 2020-06-11 PROCEDURE — 36415 COLL VENOUS BLD VENIPUNCTURE: CPT

## 2020-06-11 PROCEDURE — 80048 BASIC METABOLIC PNL TOTAL CA: CPT

## 2020-06-11 NOTE — EMERGENCY DEPARTMENT REPORT
ED Shortness of Breath HPI





- General


Chief Complaint: Dyspnea/Respdistress


Stated Complaint: SOB,COUGH,CHEST PAIN


Time Seen by Provider: 06/11/20 23:16


Source: patient


Mode of arrival: Ambulatory


Limitations: No Limitations





- History of Present Illness


Initial Comments: 





Patient is a 40-year-old female that presents emergency room with complaints of 

chest pain, shortness of breath, cough.  Patient denies fever and chills.  

Patient states that her uncle is in this hospital for a corona infection.  

Patient states her chest pain is a 6 out of 10.  Patient states is worse with 

deep breath and movement.  Patient states that her shortness of breath is better

with rest and worse with exertion.  Patient states she was recently diagnosed 

with pneumonia and discharged home.  Patient states her cough is dry.  Patient 

denies body aches.  Patient denies fever.  Patient denies nausea vomiting.





Patient was diagnosed he was Bactrim around noon and then exposed to COVID by 

her uncle.


MD Complaint: shortness of breath, cough, chest pain, pain with inspiration


-: Sudden


Severity: severe


Improves With: rest


Worsens With: movement


Known History Of: asthma


Associated Symptoms: chest pain, cough


Treatments Prior to Arrival: none





- Related Data


Home Oxygen Therapy: No


                                  Previous Rx's











 Medication  Instructions  Recorded  Last Taken  Type


 


Montelukast [Singulair] 10 mg PO QPM #30 tablet 10/19/16 Unknown Rx


 


Butalb/Acetamin/Caff -40 1 each PO Q4H PRN #20 tablet 06/02/17 Unknown Rx





[Fioricet]    


 


Acetaminophen [Tylenol Arthritis] 650 mg PO Q6HR PRN #30 tablet.er 12/26/18 

Unknown Rx


 


Ibuprofen [Motrin] 600 mg PO Q8H PRN #30 tablet 12/26/18 Unknown Rx


 


Albuterol Sulfate [Proair 2 puff IH Q4HR PRN #1 aer.pow.ba 09/29/19 Unknown Rx





Respiclick]    


 


ALBUTEROL NEB's [Proventil 0.083% 2.5 mg IH TID PRN #270 neb 11/17/19 Unknown Rx





NEBS]    


 


Albuterol INH(or & Nicu Only) 2 puff IH QID PRN #2 inhalation 11/17/19 Unknown 

Rx





[ProAir HFA Inhaler]    


 


Nebulizer and Compressor [Portable 1 each MC TID #1 each 11/17/19 Unknown Rx





Nebulizer System]    


 


predniSONE [Deltasone] 20 mg PO QDAY #5 tab 11/17/19 Unknown Rx


 


Albuterol INH(or & Nicu Only) 2 puff IH QID PRN #8.5 gram 03/24/20 Unknown Rx





[ProAir HFA Inhaler]    


 


Albuterol INH(or & Nicu Only) 2 puff IH QID PRN #8.5 gram 05/24/20 Unknown Rx





[ProAir HFA Inhaler]    


 


Prednisone [predniSONE 10 mg 10 mg PO .TAPER #1 tab.ds.pk 05/24/20 Unknown Rx





(6-Day Pack, 21 Tabs)]    


 


levoFLOXacin [Levaquin TAB] 500 mg PO QDAY #10 tablet 05/24/20 Unknown Rx


 


Benzonatate [Tessalon Perles] 100 mg PO Q8HR PRN #20 capsule 05/26/20 Unknown Rx


 


Loratadine [Claritin] 10 mg PO QDAY #15 tablet 05/26/20 Unknown Rx


 


hydroCHLOROthiazide [Hctz] 12.5 mg PO QDAY #30 capsule 05/26/20 Unknown Rx


 


DOXYCYCLINE Hyclate [Vibramycin 100 mg PO Q12HR 10 Days #20 capsule 06/12/20 

Unknown Rx





CAP]    











                                    Allergies











Allergy/AdvReac Type Severity Reaction Status Date / Time


 


Penicillins Allergy  Rash Verified 10/19/16 09:15














ED Review of Systems


ROS: 


Stated complaint: SOB,COUGH,CHEST PAIN


Other details as noted in HPI





Constitutional: malaise.  denies: chills, fever


Eyes: denies: eye pain, eye discharge, vision change


ENT: denies: ear pain, throat pain


Respiratory: cough, shortness of breath.  denies: wheezing


Cardiovascular: chest pain.  denies: palpitations


Endocrine: no symptoms reported


Gastrointestinal: denies: abdominal pain, nausea, diarrhea


Genitourinary: denies: urgency, dysuria, discharge


Musculoskeletal: denies: back pain, joint swelling, arthralgia


Skin: denies: rash, lesions


Neurological: denies: headache, weakness, paresthesias


Psychiatric: denies: anxiety, depression


Hematological/Lymphatic: denies: easy bleeding, easy bruising





ED Past Medical Hx





- Past Medical History


Previous Medical History?: Yes


Hx Hypertension: Yes (with pregnancy)


Hx Asthma: Yes


Additional medical history: TIA.  OBESITY





- Surgical History


Past Surgical History?: Yes


Hx Appendectomy: Yes


Additional Surgical History: LEFT FOOT SURGERY/ CS X 3





- Family History


Family history: no significant





- Social History


Smoking Status: Never Smoker


Substance Use Type: None





- Medications


Home Medications: 


                                Home Medications











 Medication  Instructions  Recorded  Confirmed  Last Taken  Type


 


Montelukast [Singulair] 10 mg PO QPM #30 tablet 10/19/16  Unknown Rx


 


Butalb/Acetamin/Caff -40 1 each PO Q4H PRN #20 tablet 06/02/17  Unknown Rx





[Fioricet]     


 


Acetaminophen [Tylenol Arthritis] 650 mg PO Q6HR PRN #30 tablet.er 12/26/18  

Unknown Rx


 


Ibuprofen [Motrin] 600 mg PO Q8H PRN #30 tablet 12/26/18  Unknown Rx


 


Albuterol Sulfate [Proair 2 puff IH Q4HR PRN #1 aer.pow.ba 09/29/19  Unknown Rx





Respiclick]     


 


ALBUTEROL NEB's [Proventil 0.083% 2.5 mg IH TID PRN #270 neb 11/17/19  Unknown 

Rx





NEBS]     


 


Albuterol INH(or & Nicu Only) 2 puff IH QID PRN #2 inhalation 11/17/19  Unknown 

Rx





[ProAir HFA Inhaler]     


 


Nebulizer and Compressor [Portable 1 each MC TID #1 each 11/17/19  Unknown Rx





Nebulizer System]     


 


predniSONE [Deltasone] 20 mg PO QDAY #5 tab 11/17/19  Unknown Rx


 


Albuterol INH(or & Nicu Only) 2 puff IH QID PRN #8.5 gram 03/24/20  Unknown Rx





[ProAir HFA Inhaler]     


 


Albuterol INH(or & Nicu Only) 2 puff IH QID PRN #8.5 gram 05/24/20  Unknown Rx





[ProAir HFA Inhaler]     


 


Prednisone [predniSONE 10 mg 10 mg PO .TAPER #1 tab.ds.pk 05/24/20  Unknown Rx





(6-Day Pack, 21 Tabs)]     


 


levoFLOXacin [Levaquin TAB] 500 mg PO QDAY #10 tablet 05/24/20  Unknown Rx


 


Benzonatate [Tessalon Perles] 100 mg PO Q8HR PRN #20 capsule 05/26/20  Unknown 

Rx


 


Loratadine [Claritin] 10 mg PO QDAY #15 tablet 05/26/20  Unknown Rx


 


hydroCHLOROthiazide [Hctz] 12.5 mg PO QDAY #30 capsule 05/26/20  Unknown Rx


 


DOXYCYCLINE Hyclate [Vibramycin 100 mg PO Q12HR 10 Days #20 capsule 06/12/20  

Unknown Rx





CAP]     














ED Physical Exam





- General


Limitations: No Limitations


General appearance: alert, in no apparent distress





- Head


Head exam: Present: atraumatic, normocephalic





- Eye


Eye exam: Present: normal appearance





- ENT


ENT exam: Present: mucous membranes moist





- Neck


Neck exam: Present: normal inspection





- Respiratory


Respiratory exam: Present: normal lung sounds bilaterally, chest wall 

tenderness.  Absent: respiratory distress, wheezes





- Cardiovascular


Cardiovascular Exam: Present: regular rate, normal rhythm.  Absent: systolic 

murmur, diastolic murmur, rubs, gallop





- GI/Abdominal


GI/Abdominal exam: Present: soft, normal bowel sounds





- Extremities Exam


Extremities exam: Present: normal inspection





- Back Exam


Back exam: Present: normal inspection





- Neurological Exam


Neurological exam: Present: alert, oriented X3





- Psychiatric


Psychiatric exam: Present: normal affect, normal mood





- Skin


Skin exam: Present: warm, dry, intact, normal color.  Absent: rash





ED Course


                                   Vital Signs











  06/11/20 06/11/20 06/11/20





  22:21 23:22 23:30


 


Temperature 98.4 F  


 


Pulse Rate 108 H  100 H


 


Respiratory 24  20





Rate   


 


Blood Pressure 182/111 148/99 135/94


 


O2 Sat by Pulse 98  





Oximetry   














  06/11/20 06/12/20 06/12/20





  23:46 00:00 00:06


 


Temperature   


 


Pulse Rate 95 H 100 H 102 H


 


Respiratory 28 H 29 H 32 H





Rate   


 


Blood Pressure 135/94 134/96 


 


O2 Sat by Pulse 97 96 97





Oximetry   














  06/12/20 06/12/20 06/12/20





  00:16 00:30 00:46


 


Temperature   


 


Pulse Rate 93 H 100 H 104 H


 


Respiratory 20 25 H 24





Rate   


 


Blood Pressure  141/90 141/90


 


O2 Sat by Pulse 98 98 98





Oximetry   














  06/12/20 06/12/20





  01:00 02:01


 


Temperature  98.4 F


 


Pulse Rate 101 H 101 H


 


Respiratory 23 23





Rate  


 


Blood Pressure 133/88 


 


O2 Sat by Pulse 99 99





Oximetry  














- Reevaluation(s)


Reevaluation #1: 


Patient did not desat during ambulation.  Patient states her shortness of breath

has improved.  Patient states she would prefer to go home and quarantine at 

home.








I discussed all results and clinical findings with patient.  I discussed plan of

care with patient.  Patient agrees with plan of care.  Patient is stable for 

discharge.  Patient will be discharged home.  Patient given discharge 

instructions.  Patient voiced understanding of discharge instructions.


06/12/20 01:09











ED Medical Decision Making





- Lab Data


Result diagrams: 


                                 06/11/20 22:50





                                 06/11/20 22:50





- EKG Data


-: EKG Interpreted by Me


EKG shows normal: sinus rhythm, axis, intervals, QRS complexes, ST-T waves


Rate: tachycardia





- Radiology Data


Radiology results: report reviewed, image reviewed





CHEST 1 VIEW 6/11/2020 10:41 PM 





INDICATION / CLINICAL INFORMATION: 


Chest Pain. 





COMPARISON: 


One view of the chest from 5/26/2020. 





FINDINGS: 





SUPPORT DEVICES: None. 


HEART / MEDIASTINUM: No significant abnormality. 


LUNGS / PLEURA: The previously seen right midlung consolidation has nearly 

completely resolved. The


 lungs are otherwise clear. No significant pleural effusion or pneumothorax. 





ADDITIONAL FINDINGS: No significant additional findings. 





IMPRESSION: 


Nearly complete resolution of the previously described right midlung 

consolidation. No new acute 


 abnormality of the chest. 





- Medical Decision Making





Patient is a 40-year-old female that presents emergency room with complaints of 

chest pain, shortness of breath, cough.  Patient has a recent COVID exposure.  

Patient already being treated for pneumonia and is still taking her pneumonia 

medications.  Patient has asthma but denies wheezing.  Patient chest x-ray shows

improvement of her pneumonia.  Patient's clinical scenario is most likely 

secondary to novel coronavirus.  Patient to self quarantine and follow-up with 

the health department.  Patient will need COVID-19 testing.  Patient's labs are 

unremarkable.  Patient's troponin is negative.  Patient's EKG is negative for 

STEMI.  Patient stable for discharge.. Patient's vital signs and oxygen 

saturations stable throughout stay in the ER.  Patient will be given another 

oral antibiotic just to assist in the resolution of her pneumonia and protect 

her from developing another pneumonia since she most likely has a COVID-19 

infection.  Patient ambulatory and did not desat.





- Differential Diagnosis


Pneumonia, COVID, S OB, chest wall pain


Critical care attestation.: 


If time is entered above; I have spent that time in minutes in the direct care 

of this critically ill patient, excluding procedure time.








ED Disposition


Clinical Impression: 


 Cough, Shortness of breath, Suspected COVID-19 virus infection, Chest wall pain





Pneumonia


Qualifiers:


 Pneumonia type: due to unspecified organism Laterality: right Lung location: 

middle lobe of lung Qualified Code(s): J18.9 - Pneumonia, unspecified organism





Chest pain


Qualifiers:


 Chest pain type: unspecified Qualified Code(s): R07.9 - Chest pain, unspecified





Disposition: DC-01 TO HOME OR SELFCARE


Is pt being admited?: No


Does the pt Need Aspirin: No


Condition: Stable


Instructions:  COVID-19, Chest Pain (ED), Community-acquired Pneumonia (ED), 

Bacterial Pneumonia (ED)


Additional Instructions: 


Patient to follow-up with primary care in 2 to 3 days.   Patient to rest.  

Patient to increase water.  Patient to self quarantine at home for 14 days.  

Patient to contact health department.  Patient to seek COVID testing.    Patient

to take Tylenol  as needed for pain.  Patient to take meds as directed.  Patient

to return to the ER if condition worsens, changes or new symptoms arise.  

Patient to complete pneumonia treatment given at her previous visit. ,


Prescriptions: 


DOXYCYCLINE Hyclate [Vibramycin CAP] 100 mg PO Q12HR 10 Days #20 capsule


Referrals: 


PRIMARY CARE,MD [Primary Care Provider] - 3-5 Days


Time of Disposition: 01:11


Print Language: Georgian

## 2020-06-11 NOTE — XRAY REPORT
CHEST 1 VIEW 6/11/2020 10:41 PM



INDICATION / CLINICAL INFORMATION:

Chest Pain.



COMPARISON: 

One view of the chest from 5/26/2020.



FINDINGS:



SUPPORT DEVICES: None.

HEART / MEDIASTINUM: No significant abnormality. 

LUNGS / PLEURA: The previously seen right midlung consolidation has nearly completely resolved. The l
ungs are otherwise clear. No significant pleural effusion or pneumothorax. 



ADDITIONAL FINDINGS: No significant additional findings.



IMPRESSION:

Nearly complete resolution of the previously described right midlung consolidation. No new acute abno
rmality of the chest.



Signer Name: Scott Salmeron MD 

Signed: 6/11/2020 11:53 PM

Workstation Name: [id number]

## 2020-06-12 VITALS — SYSTOLIC BLOOD PRESSURE: 133 MMHG | DIASTOLIC BLOOD PRESSURE: 88 MMHG

## 2021-03-25 ENCOUNTER — HOSPITAL ENCOUNTER (EMERGENCY)
Dept: HOSPITAL 5 - ED | Age: 41
Discharge: HOME | End: 2021-03-25
Payer: SELF-PAY

## 2021-03-25 VITALS — DIASTOLIC BLOOD PRESSURE: 98 MMHG | SYSTOLIC BLOOD PRESSURE: 154 MMHG

## 2021-03-25 DIAGNOSIS — J45.909: ICD-10-CM

## 2021-03-25 DIAGNOSIS — I10: ICD-10-CM

## 2021-03-25 DIAGNOSIS — M79.632: Primary | ICD-10-CM

## 2021-03-25 DIAGNOSIS — Z79.899: ICD-10-CM

## 2021-03-25 PROCEDURE — 99282 EMERGENCY DEPT VISIT SF MDM: CPT

## 2021-03-25 NOTE — EMERGENCY DEPARTMENT REPORT
ED Upper Extremity Inj HPI





- General


Chief Complaint: Extremity Injury, Upper


Stated Complaint: LEFT ARM PAIN


Time Seen by Provider: 03/25/21 01:23


Source: patient


Mode of arrival: Ambulatory


Limitations: No Limitations





- History of Present Illness


Initial Comments: 





40-year-old female that emerge department complaining of pain to the left 

forearm region and radiates up the forearm towards the elbow since 2:00 this 

afternoon of unknown etiology.  Reports no known trauma.  The pain is worse with

palpation and range of motion and gripping.


MD Complaint: Injury to:: arm


-: Gradual


Other Extremity Injury: Forearm: Left


Place: home


Improves With: none


Worsens With: movement of extremity


Associated Symptoms: denies other symptoms





- Related Data


                                  Previous Rx's











 Medication  Instructions  Recorded  Last Taken  Type


 


Butalb/Acetamin/Caff -40 1 each PO Q4H PRN #20 tablet 06/02/17 Unknown Rx





[Fioricet]    


 


Acetaminophen [Tylenol Arthritis] 650 mg PO Q6HR PRN #30 tablet.er 12/26/18 

Unknown Rx


 


Ibuprofen [Motrin] 600 mg PO Q8H PRN #30 tablet 12/26/18 Unknown Rx


 


Albuterol Sulfate [Proair 2 puff IH Q4HR PRN #1 aer.pow.ba 09/29/19 Unknown Rx





Respiclick]    


 


Albuterol Mdi (or & Nicu Only) 2 puff IH QID PRN #2 inhalation 11/17/19 Unknown 

Rx





[ProAir HFA Inhaler]    


 


Nebulizer and Compressor [Portable 1 each MC TID #1 each 11/17/19 Unknown Rx





Nebulizer System]    


 


predniSONE [Deltasone] 20 mg PO QDAY #5 tab 11/17/19 Unknown Rx


 


Albuterol Mdi (or & Nicu Only) 2 puff IH QID PRN #8.5 gram 03/24/20 Unknown Rx





[ProAir HFA Inhaler]    


 


levoFLOXacin [Levaquin TAB] 500 mg PO QDAY #10 tablet 05/24/20 Unknown Rx


 


Loratadine [Claritin] 10 mg PO QDAY #15 tablet 05/26/20 Unknown Rx


 


hydroCHLOROthiazide [Hctz] 12.5 mg PO QDAY #30 capsule 05/26/20 Unknown Rx


 


DOXYCYCLINE Hyclate [Vibramycin 100 mg PO Q12HR 10 Days #20 capsule 06/12/20 

Unknown Rx





CAP]    


 


ALBUTEROL NEB's [Proventil 0.083% 3 ml IH Q6H PRN #150 ml 07/14/20 Unknown Rx





NEBS]    


 


Albuterol Mdi (or & Nicu Only) 2 puff IH QID PRN #1 inh 07/14/20 Unknown Rx





[ProAir HFA Inhaler]    


 


Benzonatate [Tessalon Perles] 100 mg PO Q8HR PRN #30 capsule 07/14/20 Unknown Rx


 


Montelukast [Singulair] 10 mg PO QPM #30 tablet 07/14/20 Unknown Rx


 


Prednisone [predniSONE 10 mg 10 mg PO .TAPER #21 tab.ds.pk 07/14/20 Unknown Rx





(6-Day Pack, 21 Tabs)]    


 


levoFLOXacin [Levaquin] 750 mg PO QDAY #7 tablet 07/14/20 Unknown Rx


 


Ketorolac [Toradol] 10 mg PO Q6H PRN #10 tablet 03/25/21 Unknown Rx


 


methOCARBAMOL [Robaxin TAB] 750 mg PO Q8H #21 tablet 03/25/21 Unknown Rx











                                    Allergies











Allergy/AdvReac Type Severity Reaction Status Date / Time


 


Penicillins Allergy  Rash Verified 10/19/16 09:15














ED Review of Systems


ROS: 


Stated complaint: LEFT ARM PAIN


Other details as noted in HPI





Comment: All other systems reviewed and negative





ED Past Medical Hx





- Past Medical History


Hx Hypertension: Yes (with pregnancy)


Hx Asthma: Yes


Additional medical history: TIA.  OBESITY





- Surgical History


Hx Appendectomy: Yes


Additional Surgical History: LEFT FOOT SURGERY/ CS X 3





- Social History


Smoking Status: Never Smoker





- Medications


Home Medications: 


                                Home Medications











 Medication  Instructions  Recorded  Confirmed  Last Taken  Type


 


Butalb/Acetamin/Caff -40 1 each PO Q4H PRN #20 tablet 06/02/17  Unknown Rx





[Fioricet]     


 


Acetaminophen [Tylenol Arthritis] 650 mg PO Q6HR PRN #30 tablet.er 12/26/18  

Unknown Rx


 


Ibuprofen [Motrin] 600 mg PO Q8H PRN #30 tablet 12/26/18  Unknown Rx


 


Albuterol Sulfate [Proair 2 puff IH Q4HR PRN #1 aer.pow.ba 09/29/19  Unknown Rx





Respiclick]     


 


Albuterol Mdi (or & Nicu Only) 2 puff IH QID PRN #2 inhalation 11/17/19  Unknown

Rx





[ProAir HFA Inhaler]     


 


Nebulizer and Compressor [Portable 1 each MC TID #1 each 11/17/19  Unknown Rx





Nebulizer System]     


 


predniSONE [Deltasone] 20 mg PO QDAY #5 tab 11/17/19  Unknown Rx


 


Albuterol Mdi (or & Nicu Only) 2 puff IH QID PRN #8.5 gram 03/24/20  Unknown Rx





[ProAir HFA Inhaler]     


 


levoFLOXacin [Levaquin TAB] 500 mg PO QDAY #10 tablet 05/24/20  Unknown Rx


 


Loratadine [Claritin] 10 mg PO QDAY #15 tablet 05/26/20  Unknown Rx


 


hydroCHLOROthiazide [Hctz] 12.5 mg PO QDAY #30 capsule 05/26/20  Unknown Rx


 


DOXYCYCLINE Hyclate [Vibramycin 100 mg PO Q12HR 10 Days #20 capsule 06/12/20  

Unknown Rx





CAP]     


 


ALBUTEROL NEB's [Proventil 0.083% 3 ml IH Q6H PRN #150 ml 07/14/20  Unknown Rx





NEBS]     


 


Albuterol Mdi (or & Nicu Only) 2 puff IH QID PRN #1 inh 07/14/20  Unknown Rx





[ProAir HFA Inhaler]     


 


Benzonatate [Tessalon Perles] 100 mg PO Q8HR PRN #30 capsule 07/14/20  Unknown 

Rx


 


Montelukast [Singulair] 10 mg PO QPM #30 tablet 07/14/20  Unknown Rx


 


Prednisone [predniSONE 10 mg 10 mg PO .TAPER #21 tab.ds.pk 07/14/20  Unknown Rx





(6-Day Pack, 21 Tabs)]     


 


levoFLOXacin [Levaquin] 750 mg PO QDAY #7 tablet 07/14/20  Unknown Rx


 


Ketorolac [Toradol] 10 mg PO Q6H PRN #10 tablet 03/25/21  Unknown Rx


 


methOCARBAMOL [Robaxin TAB] 750 mg PO Q8H #21 tablet 03/25/21  Unknown Rx














ED Physical Exam





- General


Limitations: No Limitations


General appearance: alert, in no apparent distress





- Head


Head exam: Present: atraumatic, normocephalic





- Eye


Eye exam: Present: normal appearance, PERRL, EOMI


Pupils: Present: normal accommodation





- ENT


ENT exam: Present: normal exam, normal orophraynx, mucous membranes moist, TM's 

normal bilaterally





- Neck


Neck exam: Present: normal inspection, full ROM





- Respiratory


Respiratory exam: Present: normal lung sounds bilaterally.  Absent: respiratory 

distress, wheezes, rales, chest wall tenderness, accessory muscle use





- Cardiovascular


Cardiovascular Exam: Present: regular rate, normal rhythm.  Absent: systolic 

murmur, diastolic murmur, rubs, gallop





- GI/Abdominal


GI/Abdominal exam: Present: soft, normal bowel sounds





- Extremities Exam


Extremities exam: Present: normal inspection, normal capillary refill





- Back Exam


Back exam: Present: normal inspection.  Absent: CVA tenderness (R), CVA 

tenderness (L)





- Neurological Exam


Neurological exam: Present: alert, oriented X3, CN II-XII intact





- Psychiatric


Psychiatric exam: Present: normal affect, normal mood.  Absent: anxious, flat 

affect





- Skin


Skin exam: Present: warm, dry, intact, normal color.  Absent: rash, cyanosis, 

diaphoretic, erythema, urticaria





ED Course





                                   Vital Signs











  03/25/21





  01:08


 


Temperature 97.8 F


 


Pulse Rate 98 H


 


Respiratory 18





Rate 


 


Blood Pressure 154/98


 


O2 Sat by Pulse 100





Oximetry 











Critical care attestation.: 


If time is entered above; I have spent that time in minutes in the direct care 

of this critically ill patient, excluding procedure time.








ED Disposition


Clinical Impression: 


 Pain in left arm, Musculoskeletal pain





Disposition: DC-01 TO HOME OR SELFCARE


Is pt being admited?: No


Does the pt Need Aspirin: No


Condition: Stable


Instructions:  How to Use Cold Therapy, Musculoskeletal Pain


Prescriptions: 


methOCARBAMOL [Robaxin TAB] 750 mg PO Q8H #21 tablet


Ketorolac [Toradol] 10 mg PO Q6H PRN #10 tablet


 PRN Reason: Pain


Referrals: 


PRIMARY CARE,MD [Primary Care Provider] - 3-5 Days


[facility] [Provider Group] - 3-5 Days


Print Language: Malay

## 2022-01-01 ENCOUNTER — HOSPITAL ENCOUNTER (EMERGENCY)
Dept: HOSPITAL 5 - ED | Age: 42
Discharge: HOME | End: 2022-01-01
Payer: SELF-PAY

## 2022-01-01 VITALS — SYSTOLIC BLOOD PRESSURE: 122 MMHG | DIASTOLIC BLOOD PRESSURE: 72 MMHG

## 2022-01-01 DIAGNOSIS — B97.89: ICD-10-CM

## 2022-01-01 DIAGNOSIS — J06.9: ICD-10-CM

## 2022-01-01 DIAGNOSIS — J45.909: Primary | ICD-10-CM

## 2022-01-01 DIAGNOSIS — Z88.0: ICD-10-CM

## 2022-01-01 DIAGNOSIS — I10: ICD-10-CM

## 2022-01-01 DIAGNOSIS — Z90.49: ICD-10-CM

## 2022-01-01 DIAGNOSIS — Z79.899: ICD-10-CM

## 2022-01-01 PROCEDURE — 71046 X-RAY EXAM CHEST 2 VIEWS: CPT

## 2022-01-01 PROCEDURE — 99283 EMERGENCY DEPT VISIT LOW MDM: CPT

## 2022-01-01 PROCEDURE — 94640 AIRWAY INHALATION TREATMENT: CPT

## 2022-01-01 NOTE — XRAY REPORT
CHEST 2 VIEWS 



INDICATION / CLINICAL INFORMATION:

Cough/asthma.



COMPARISON: 

7/14/2020



FINDINGS:



SUPPORT DEVICES: None.



HEART / MEDIASTINUM: No significant abnormality. 



LUNGS / PLEURA: No significant pulmonary or pleural abnormality. No pneumothorax. 



ADDITIONAL FINDINGS: No significant additional findings.



IMPRESSION:

1. No acute findings. .



Signer Name: Amy Pablo MD 

Signed: 1/1/2022 7:08 AM

Workstation Name: [id number]

## 2022-01-01 NOTE — EMERGENCY DEPARTMENT REPORT
- General


Chief Complaint: Upper Respiratory Infection


Stated Complaint: ASTHMA


Time Seen by Provider: 22 06:09


Source: patient


Mode of arrival: Ambulatory


Limitations: No Limitations





- History of Present Illness


Initial Comments: 


41-year-old female with a past medical history of asthma presents to the ER 

today with complaints of cough. Patient states that she has had a cough for 

about a week, but 3 days ago cough became severe, to the point when she coughs 

it hurts her upper back she reports associated runny nose and nasal congestion 

and subjective fever and intermittent diaphoresis. She denies any wheezing, 

chest discomfort or shortness of breath. She states that she ran out of her 

albuterol MDI about 1 week ago. She denies any known ill contacts or recent 

travel. She has not had a COVID-19 test or flu test since she has been sick. She

has not had a COVID-19 vaccine. She states that she has never been hospitalized 

for asthma in the past. She denies any tobacco use.





MD Complaint: fever, cough, rhinorrhea, nasal congestion


-: week(s)





- Related Data


                                  Previous Rx's











 Medication  Instructions  Recorded  Last Taken  Type


 


Butalb/Acetamin/Caff -40 1 each PO Q4H PRN #20 tablet 17 Unknown Rx





[Fioricet]    


 


Acetaminophen [Tylenol Arthritis] 650 mg PO Q6HR PRN #30 tablet.er 18 

Unknown Rx


 


Ibuprofen [Motrin] 600 mg PO Q8H PRN #30 tablet 18 Unknown Rx


 


Albuterol Sulfate [Proair 2 puff IH Q4HR PRN #1 aer.pow.ba 19 Unknown Rx





Respiclick]    


 


Albuterol Mdi (or & Nicu Only) 2 puff IH QID PRN #2 inhalation 19 Unknown 

Rx





[ProAir HFA Inhaler]    


 


Nebulizer and Compressor [Portable 1 each MC TID #1 each 19 Unknown Rx





Nebulizer System]    


 


predniSONE [Deltasone] 20 mg PO QDAY #5 tab 19 Unknown Rx


 


Albuterol Mdi (or & Nicu Only) 2 puff IH QID PRN #8.5 gram 20 Unknown Rx





[ProAir HFA Inhaler]    


 


levoFLOXacin [Levaquin TAB] 500 mg PO QDAY #10 tablet 20 Unknown Rx


 


hydroCHLOROthiazide [Hctz] 12.5 mg PO QDAY #30 capsule 20 Unknown Rx


 


DOXYCYCLINE Hyclate [Vibramycin 100 mg PO Q12HR 10 Days #20 capsule 20 

Unknown Rx





CAP]    


 


ALBUTEROL NEB's [Proventil 0.083% 3 ml IH Q6H PRN #150 ml 20 Unknown Rx





NEBS]    


 


Montelukast [Singulair] 10 mg PO QPM #30 tablet 20 Unknown Rx


 


Prednisone [predniSONE 10 mg 10 mg PO .TAPER #21 tab.ds.pk 20 Unknown Rx





(6-Day Pack, 21 Tabs)]    


 


levoFLOXacin [Levaquin] 750 mg PO QDAY #7 tablet 20 Unknown Rx


 


Ketorolac [Toradol] 10 mg PO Q6H PRN #10 tablet 21 Unknown Rx


 


methOCARBAMOL [Robaxin TAB] 750 mg PO Q8H #21 tablet 21 Unknown Rx


 


Albuterol Mdi (or & Nicu Only) 2 puff IH QID PRN #1 inh 22 Unknown Rx





[ProAir HFA Inhaler]    


 


Benzonatate [Tessalon Perles] 100 mg PO Q8HR PRN #30 capsule 22 Unknown Rx


 


Loratadine [Claritin] 10 mg PO QDAY #15 tablet 22 Unknown Rx


 


predniSONE [Deltasone] 50 mg PO QDAY #5 tab 22 Unknown Rx











                                    Allergies











Allergy/AdvReac Type Severity Reaction Status Date / Time


 


Penicillins Allergy  Rash Verified 10/19/16 09:15














ED Review of Systems


ROS: 


Stated complaint: ASTHMA


Other details as noted in HPI





Comment: All other systems reviewed and negative


Constitutional: fever (subjective )


Eyes: denies: eye pain, eye discharge, vision change


ENT: congestion, other (runny nose).  denies: ear pain, throat pain, dental 

pain, hearing loss


Respiratory: cough.  denies: shortness of breath, SOB with exertion, SOB at rest


Cardiovascular: denies: chest pain, palpitations, dyspnea on exertion, edema, 

syncope, paroxysmal nocturnal dyspnea


Gastrointestinal: denies: abdominal pain, nausea, vomiting, diarrhea, 

constipation, hematemesis, melena, hematochezia


Genitourinary: denies: urgency, dysuria, discharge, abnormal menses, dyspareunia


Musculoskeletal: back pain.  denies: joint swelling, arthralgia, myalgia


Skin: denies: rash, lesions, change in color, change in hair/nails, pruritus, 

other


Neurological: denies: headache, weakness, numbness, paresthesias, confusion, 

abnormal gait, vertigo


Psychiatric: denies: anxiety, depression, auditory hallucinations, visual 

hallucinations, homicidal thoughts, suicidal thoughts


Hematological/Lymphatic: denies: easy bleeding, easy bruising, swollen glands





ED Past Medical Hx





- Past Medical History


Hx Hypertension: Yes (with pregnancy)


Hx Asthma: Yes


Additional medical history: TIA.  OBESITY





- Surgical History


Hx Appendectomy: Yes


Additional Surgical History: LEFT FOOT SURGERY/ CS X 3





- Social History


Smoking Status: Never Smoker





- Medications


Home Medications: 


                                Home Medications











 Medication  Instructions  Recorded  Confirmed  Last Taken  Type


 


Butalb/Acetamin/Caff -40 1 each PO Q4H PRN #20 tablet 17  Unknown Rx





[Fioricet]     


 


Acetaminophen [Tylenol Arthritis] 650 mg PO Q6HR PRN #30 tablet.er 18  

Unknown Rx


 


Ibuprofen [Motrin] 600 mg PO Q8H PRN #30 tablet 18  Unknown Rx


 


Albuterol Sulfate [Proair 2 puff IH Q4HR PRN #1 aer.pow.ba 19  Unknown Rx





Respiclick]     


 


Albuterol Mdi (or & Nicu Only) 2 puff IH QID PRN #2 inhalation 19  Unknown

Rx





[ProAir HFA Inhaler]     


 


Nebulizer and Compressor [Portable 1 each MC TID #1 each 19  Unknown Rx





Nebulizer System]     


 


predniSONE [Deltasone] 20 mg PO QDAY #5 tab 19  Unknown Rx


 


Albuterol Mdi (or & Nicu Only) 2 puff IH QID PRN #8.5 gram 20  Unknown Rx





[ProAir HFA Inhaler]     


 


levoFLOXacin [Levaquin TAB] 500 mg PO QDAY #10 tablet 20  Unknown Rx


 


hydroCHLOROthiazide [Hctz] 12.5 mg PO QDAY #30 capsule 20  Unknown Rx


 


DOXYCYCLINE Hyclate [Vibramycin 100 mg PO Q12HR 10 Days #20 capsule 20  

Unknown Rx





CAP]     


 


ALBUTEROL NEB's [Proventil 0.083% 3 ml IH Q6H PRN #150 ml 20  Unknown Rx





NEBS]     


 


Montelukast [Singulair] 10 mg PO QPM #30 tablet 20  Unknown Rx


 


Prednisone [predniSONE 10 mg 10 mg PO .TAPER #21 tab.ds.pk 20  Unknown Rx





(6-Day Pack, 21 Tabs)]     


 


levoFLOXacin [Levaquin] 750 mg PO QDAY #7 tablet 20  Unknown Rx


 


Ketorolac [Toradol] 10 mg PO Q6H PRN #10 tablet 21  Unknown Rx


 


methOCARBAMOL [Robaxin TAB] 750 mg PO Q8H #21 tablet 21  Unknown Rx


 


Albuterol Mdi (or & Nicu Only) 2 puff IH QID PRN #1 inh 22  Unknown Rx





[ProAir HFA Inhaler]     


 


Benzonatate [Tessalon Perles] 100 mg PO Q8HR PRN #30 capsule 22  Unknown 

Rx


 


Loratadine [Claritin] 10 mg PO QDAY #15 tablet 22  Unknown Rx


 


predniSONE [Deltasone] 50 mg PO QDAY #5 tab 22  Unknown Rx














ED Physical Exam





- General


Limitations: No Limitations


General appearance: alert, in no apparent distress, obese





- Head


Head exam: Present: atraumatic, normocephalic, normal inspection





- Eye


Eye exam: Present: normal appearance, PERRL, EOMI


Pupils: Present: normal accommodation





- Neck


Neck exam: Present: normal inspection, full ROM.  Absent: meningismus





- Respiratory


Respiratory exam: Present: normal lung sounds bilaterally, decreased breath 

sounds (mild ).  Absent: respiratory distress, wheezes, rales, rhonchi, stridor





- Cardiovascular


Cardiovascular Exam: Present: regular rate, normal rhythm, normal heart sounds





- GI/Abdominal


GI/Abdominal exam: Present: soft.  Absent: distended, tenderness, guarding, 

rebound





- Extremities Exam


Extremities exam: Present: normal inspection, full ROM, normal capillary refill.

  Absent: pedal edema, calf tenderness





- Neurological Exam


Neurological exam: Present: alert, oriented X3, CN II-XII intact, normal gait





- Psychiatric


Psychiatric exam: Present: normal affect, normal mood





- Skin


Skin exam: Present: intact





ED Course


                                   Vital Signs











  22





  03:28


 


Temperature 98.4 F


 


Pulse Rate 103 H


 


Respiratory 18





Rate 


 


Blood Pressure 146/95





[Right] 


 


O2 Sat by Pulse 98





Oximetry 














ED Medical Decision Making





- Radiology Data


Radiology results: report reviewed


interpreted by me: 


Patient: GALEN FLORES                                              

                  MR  


#: [id number]          


: 1980                                                                

Acct:[id number]      


 


Age/Sex: 41 / F                                                                

ADM Date: 22     


 


Loc: ED       


Attending Dr:   


 


 


Ordering Physician: ISAEL MARTINEZ  


Date of Service: 22  


Procedure(s): XR chest routine 2V  


Accession Number(s): [id number]  


 


cc: ISAEL MARTINEZ   


 


Fluoro Time In Minutes:   


 


CHEST 2 VIEWS   


 


 INDICATION / CLINICAL INFORMATION:  


 Cough/asthma.  


 


 COMPARISON:   


 2020  


 


 FINDINGS:  


 


 SUPPORT DEVICES: None.  


 


 HEART / MEDIASTINUM: No significant abnormality.   


 


 LUNGS / PLEURA: No significant pulmonary or pleural abnormality. No 

pneumothorax.   


 


 ADDITIONAL FINDINGS: No significant additional findings.  


 


 IMPRESSION:  


 1. No acute findings. .  


 


 Signer Name: Amy Pablo MD   


 Signed: 2022 7:08 AM  


 Workstation Name: [id number]   


 


 


Transcribed By: JR  


Dictated By: Amy Pablo MD  


Electronically Authenticated By: Amy Pablo MD    


Signed Date/Time: 22                                


 


 


 


DD/DT: 22                                                            

  


TD/TT:








- Medical Decision Making


41-year-old female with a past medical history of asthma presents to the ER 

today with complaints of cough. Patient states that she has had a cough for 

about a week, but 3 days ago cough became severe, to the point when she coughs 

it hurts her upper back she reports associated runny nose and nasal congestion 

and subjective fever and intermittent diaphoresis. She denies any wheezing, 

chest discomfort or shortness of breath. She states that she ran out of her 

albuterol MDI about 1 week ago. She denies any known ill contacts or recent 

travel. She has not had a COVID-19 test or flu test since she has been sick. She

 has not had a COVID-19 vaccine. She states that she has never been hospitalized

 for asthma in the past. She denies any tobacco use.





0830: Patient currently sitting comfortably in a recliner, talking on her phone.

  She reports feeling better after nebulizer treatment and meds given here in 

the ER today.  Chest x-ray shows no acute abnormalities.  Patient currently is 

not toxic, not ill-appearing, she is not in any acute respiratory distress and 

she appears well-hydrated and she is neurologically intact.  Repeat chest exam 

shows improvement of breath sounds.  No wheezing noted.  Repeat vital signs are 

stable.  At this time I suspect patient symptoms are likely related to a viral 

illness including COVID-19.  Discussed x-ray results with patient.  Recommend to

 her that she should get an outpatient COVID-19 test as this could be the cause 

of her symptoms.  In the meantime she will be given medication for her asthma as

 well as medication to help her symptoms.  Patient expressed understanding agree

 with plan.  Patient was stable at time of discharge.


Critical care attestation.: 


If time is entered above; I have spent that time in minutes in the direct care 

of this critically ill patient, excluding procedure time.








ED Disposition


Clinical Impression: 


 Acute asthmatic bronchitis, Viral URI with cough





Disposition: 01 HOME / SELF CARE / HOMELESS


Is pt being admited?: No


Does the pt Need Aspirin: No


Condition: Stable


Instructions:  Acute Bronchitis (ED), Upper Respiratory Infection, Adult, 

Easy-to-Read, Asthma, Adult, Easy-to-Read, Viral Illness, Adult


Additional Instructions: 


Take the prednisone and the Tessalon Perles and the Zyrtec as prescribed.  Use 

your albuterol inhaler as prescribed to help with any coughing spells, wheezing 

or shortness of breath.  I do recommend that you get an outpatient COVID-19 test

 at any local urgent care or pharmacy.  If your test is positive you need to 

quarantine for 5 days.  Drink lots of fluids.  You can take Tylenol and 

ibuprofen for any pain or fever.  I recommend that you get a pulse ox monitor to

 monitor your oxygen.  If you have a persistent oxygen is 92% with worsening of 

your symptoms including worsening shortness of breath need to return to the ER 

immediately otherwise follow-up closely with your PCP.


Prescriptions: 


Loratadine [Claritin] 10 mg PO QDAY #15 tablet


predniSONE [Deltasone] 50 mg PO QDAY #5 tab


Albuterol Mdi (or & Nicu Only) [ProAir HFA Inhaler] 2 puff IH QID PRN #1 inh


 PRN Reason: Shortness Of Breath


Benzonatate [Tessalon Perles] 100 mg PO Q8HR PRN #30 capsule


 PRN Reason: Cough


Referrals: 


[facility] [Provider Group] - 3-5 Days


Forms:  Work/School Release Form(ED)


Time of Disposition: 08:09